# Patient Record
(demographics unavailable — no encounter records)

---

## 2024-10-24 NOTE — DISCUSSION/SUMMARY
[de-identified] : Chief complaint: Left knee pain  HPI: Patient is a 67-year-old male who presents to the office for subsequent reevaluation of knee pain.  Patient was previously diagnosed with a medial meniscal tear of the left knee by Dr. Marcelino.  He has tried therapy, anti-inflammatories, and cortisone injection to the left knee without any significant relief in the past.  Viscosupplementation was ordered by Dr. Marcelino.  Patient is here to receive left knee viscosupplementation/gel injection.  Denies any new fall, injury, or trauma.  Localizes the pain to the medial aspect of the knee by pointing.  ROS: Positive for left knee pain  Physical examination of the left knee shows: No erythema  No ecchymosis  Tenderness to palpation over medial joint line, lateral joint line No effusion Able to perform active straight leg raise Knee flexion from 0 to 120 degrees Light touch is intact throughout Positive Marcello's medially Negative anterior Drawer No appreciable instability with varus / valgus stress testing Calves are soft and nontender  Assessment/plan: Medial meniscal tear of the left knee, I discussed the risk and benefits of a gel injection. The risks included but were not limited to bleeding, infection, injury to nearby nerve/vessel/structures, skin irritation, and no guarantee of pain relief. patient understood and elected to proceed. Verbal consent was obtained.  With the patient's approval, the left knee first Euflexxa injection was performed in office today. See the attached procedure note for further details. Explained to the patient that the full effect of the medication may take up to 6 weeks for it to kick in.  The patient was advised to rest/ice the area and can alternate with warm compresses. Instructed not to do any strenuous activity that would further aggravate their symptoms.  Patient will follow-up in 1 week for second round of injections. All of the patient's questions/concerns were answered in detail.  Procedure Large joint injection was performed of left knee. The site was prepped with alcohol, ethyl chloride sprayed topically and sterile technique used. An injection of 20mg of Euflexxa, series #1 was used.  Patient tolerated procedure well. Patient was advised to call if redness, pain or fever occur and apply ice for 15 minutes out of every hour for the next 12-24 hours as tolerated.  The risks benefits, and alternatives have been discussed, and verbal consent was obtained.

## 2024-10-31 NOTE — DISCUSSION/SUMMARY
[de-identified] : Chief complaint: Left knee pain  HPI: Patient is a 67-year-old male who presents to the office for subsequent reevaluation of knee pain. Patient was previously diagnosed with a medial meniscal tear of the left knee by Dr. Marcelino. Patient is here to receive his second left knee viscosupplementation/gel injection. Denies any new fall, injury, or trauma. Tolerated the first injection well and without noted complications.   ROS: Positive for left knee pain  Physical examination of the left knee shows: No erythema No ecchymosis Tenderness to palpation over medial joint line No effusion Able to perform active straight leg raise Knee flexion from 0 to 120 degrees Light touch is intact throughout Positive Marcello's medially No appreciable instability with varus / valgus stress testing Calves are soft and nontender  Assessment/plan: Medial meniscal tear of the left knee, I again discussed the risk and benefits of a gel injection. The risks included but were not limited to bleeding, infection, injury to nearby nerve/vessel/structures, skin irritation, and no guarantee of pain relief. patient understood and elected to proceed. Verbal consent was obtained.  With the patient's approval, the left knee second Euflexxa injection was performed in office today. See the attached procedure note for further details. Explained to the patient that the full effect of the medication may take up to 6 weeks for it to kick in.  The patient was advised to rest/ice the area and can alternate with warm compresses. Instructed not to do any strenuous activity that would further aggravate their symptoms.  Patient will follow-up in 1 week for his third round of injections. All of the patient's questions/concerns were answered in detail.  Procedure Large joint injection was performed of left knee. The site was prepped with alcohol, ethyl chloride sprayed topically and sterile technique used. An injection of 20mg of Euflexxa, series #2 was used.  Patient tolerated procedure well. Patient was advised to call if redness, pain or fever occur and apply ice for 15 minutes out of every hour for the next 12-24 hours as tolerated.  The risks benefits, and alternatives have been discussed, and verbal consent was obtained.

## 2024-11-07 NOTE — DISCUSSION/SUMMARY
[de-identified] : Chief complaint: Left knee pain  HPI: Patient is a 67-year-old male who presents to the office for subsequent reevaluation of knee pain. Patient was previously diagnosed with a medial meniscal tear of the left knee by Dr. Marcelino. Patient is here to receive his third left knee viscosupplementation/gel injection. Denies any new fall, injury, or trauma. Tolerated the first and second injections well and without noted complications.  ROS: Positive for left knee pain  Physical examination of the left knee shows: No erythema No ecchymosis Tenderness to palpation over medial joint line No appreciable effusion Able to perform active straight leg raise Knee flexion from 0 to 120 degrees Light touch is intact throughout Positive Marcello's medially Calves are soft and nontender  Assessment/plan: Medial meniscal tear of the left knee, I again discussed the risk and benefits of a gel injection. The risks included but were not limited to bleeding, infection, injury to nearby nerve/vessel/structures, skin irritation, and no guarantee of pain relief. patient understood and elected to proceed. Verbal consent was obtained.  With the patient's approval, the left knee third Euflexxa injection was performed in office today. See the attached procedure note for further details. Explained to the patient that the full effect of the medication may take up to 6 weeks for it to kick in.  The patient was advised to rest/ice the area and can alternate with warm compresses. Instructed not to do any strenuous activity that would further aggravate their symptoms.  Patient will follow-up as needed for repeat evaluation as per his preference. All of the patient's questions/concerns were answered in detail.  Procedure Large joint injection was performed of left knee. The site was prepped with alcohol, ethyl chloride sprayed topically and sterile technique used. An injection of 20mg of Euflexxa, series #3 was used.  Patient tolerated procedure well. Patient was advised to call if redness, pain or fever occur and apply ice for 15 minutes out of every hour for the next 12-24 hours as tolerated.  The risks benefits, and alternatives have been discussed, and verbal consent was obtained.

## 2024-11-26 NOTE — PHYSICAL EXAM
[Normal] : supple, no neck mass and thyroid not enlarged [Normal Neck Lymph Nodes] : normal neck lymph nodes  [Normal Supraclavicular Lymph Nodes] : normal supraclavicular lymph nodes [Normal Groin Lymph Nodes] : normal groin lymph nodes [Normal Axillary Lymph Nodes] : normal axillary lymph nodes [Normal] : oriented to person, place and time, with appropriate affect [de-identified] : soft non tender, non distended [Normal Breath Sounds] : Normal breath sounds [Normal Rate and Rhythm] : normal rate and rhythm [Abdominal Masses] : No abdominal masses [Abdomen Tenderness] : ~T ~M No abdominal tenderness [No Rash or Lesion] : No rash or lesion [Alert] : alert [Oriented to Person] : oriented to person [Oriented to Place] : oriented to place [Oriented to Time] : disoriented to time [de-identified] : well groomed [de-identified] : soft non tender, non distended

## 2024-11-26 NOTE — HISTORY OF PRESENT ILLNESS
[de-identified] : JEANE ÁLVAREZ  is a pleasant 64 year year old man  who came in 06/09/2022 for pancreatic cyst referred by Dr Downing  GI History: Patient is a 63 y/o with PMHx of Gout, and DM whom was visiting [place] and developed abdominal pain which is since resolved. Patient had normal labs but CT scan noted Cyst 2.2 cm on Pancreatic neck along with a second 1.8 cm lesion. He is without fever, jaundice, weight loss, or change in stools. He had EUS done which noted cystic areas. FNA done without malignant cells.  On treatment for H Pylori. Still with occasional left upper quadrant discomfort.   8/17/2022 MRI  PANCREAS: At the level of the pancreatic neck/body are once again identified are 2 cystic structure larger of which measures up to 2.5 cm. once again identified is in homogeneity among the T2 signal within the cystic lesions. There does appear to be subtle internal enhancement. No main duct dilation.  9/1/2022: reported no symptoms,   2/21/2023: came with no reported new symptoms, still has some constant LUQ discomfort , his recent MRI showed no significant changes except enhanced septation  8/15/23 follow up for pancreatic cyst

## 2024-11-26 NOTE — CONSULT LETTER
[Dear  ___] : Dear  [unfilled], [Consult Letter:] : I had the pleasure of evaluating your patient, [unfilled]. [Please see my note below.] : Please see my note below. [Consult Closing:] : Thank you very much for allowing me to participate in the care of this patient.  If you have any questions, please do not hesitate to contact me. [Sincerely,] : Sincerely, [FreeTextEntry3] : Danilo Ken MD

## 2024-11-26 NOTE — ASSESSMENT
[FreeTextEntry1] : JEANE ÁLVAREZ  is a pleasant 64 year year old man  who came in 06/09/2022 for pancreatic cyst referred by Dr Downing   whom was visiting [place] and developed abdominal pain which is since resolved. Patient had normal labs but CT scan noted Cyst 2.2 cm on Pancreatic neck along with a second 1.8 cm lesion. He is without fever, jaundice, weight loss, or change in stools. He had EUS done which noted cystic areas. FNA done without malignant cells.  On treatment for H Pylori. Still with occasional left upper quadrant discomfort.   6/9/2022: ordered MRI panc and CA 19-9, will see him in 3-4 weeks, discussed possible DD and plan of care  8/17/2022 MRI  PANCREAS: At the level of the pancreatic neck/body are once again identified are 2 cystic structure larger of which measures up to 2.5 cm. once again identified is in homogeneity among the T2 signal within the cystic lesions. There does appear to be subtle internal enhancement. No main duct dilation.  9/1/2022: reported no symptoms,  will repeat MRI in feb 2023 2/21/2023: came with no reported new symptoms, still has some constant LUQ discomfort , his recent MRI showed no significant changes except enhanced septation, UR exam, discussed repeat EUS but patient declined and would do MRI in 6 months, will send for CA 19-9 also   the above plan of care with discussed in details to the patient and all questions were answered to patient satisfaction. patient instructed to follow up with the referring physician and patient primary care provider  8/15/23 follow up to discuss MRI. Mri reviewed with patient and spouse.  Impression:  Unchanged pancreatic cystic lesions, including dominant multiseptated pancreatic neck/body lesions in proximity to nondilated main pancreatic duct measuring up to 2.2 and 1.7 cm. Continued MRI follow-up or EUS/FNA recommended. Abdomen soft non tender, non distended. Feels well. No new medical issues. Normal appetite and bowel movements. Report he is intentionally trying to loose weight. Weight decreased by two pounds since last visit. NO lympadenopathy.  Plan will see patient in six months with new MRI and tumor markers. Encouraged to call office with any questions or concerns.  05/09/2024 : no new reported symptoms, exam is unchanged, may 7 2024 MRI showed  Pancreatic cystic lesions including 2 dominant multilobular septated cystic lesions in the pancreatic neck/proximal body redemonstrated, without significant change in size from prior. Possible septal enhancement.   will send for EUS   11/26/2024 : no new reported symptoms, exam is unchanged,MRI novemeber 2024 stable , will repeat MRI in one year  the above plan of care with discussed in details to the patient and all questions were answered to patient satisfaction. patient instructed to follow up with the referring physician and patient primary care provider   A total of 35 minutes was spent on this visit, obtaining h/p,  reviewing previous notes/imaging, counseling the patient on coming procedure and f/u , ordering tests (below), and documenting the findings in the note.

## 2024-11-26 NOTE — REASON FOR VISIT
[Follow-Up Visit] : a follow-up visit for [FreeTextEntry2] : Pancreatic cyst [Follow-Up: _____] : a [unfilled] follow-up visit [Spouse] : spouse [FreeTextEntry1] : Panc cyst